# Patient Record
Sex: FEMALE | Employment: PART TIME | URBAN - METROPOLITAN AREA
[De-identification: names, ages, dates, MRNs, and addresses within clinical notes are randomized per-mention and may not be internally consistent; named-entity substitution may affect disease eponyms.]

---

## 2024-08-04 ENCOUNTER — OFFICE VISIT (OUTPATIENT)
Dept: URGENT CARE | Age: 29
End: 2024-08-04
Payer: COMMERCIAL

## 2024-08-04 VITALS
DIASTOLIC BLOOD PRESSURE: 73 MMHG | SYSTOLIC BLOOD PRESSURE: 115 MMHG | OXYGEN SATURATION: 99 % | TEMPERATURE: 98.3 F | HEART RATE: 80 BPM | RESPIRATION RATE: 18 BRPM

## 2024-08-04 DIAGNOSIS — L23.7 POISON IVY DERMATITIS: Primary | ICD-10-CM

## 2024-08-04 PROCEDURE — 99213 OFFICE O/P EST LOW 20 MIN: CPT | Performed by: EMERGENCY MEDICINE

## 2024-08-04 RX ORDER — PREDNISONE 10 MG/1
TABLET ORAL
Qty: 54 TABLET | Refills: 0 | Status: SHIPPED | OUTPATIENT
Start: 2024-08-04 | End: 2024-08-18

## 2024-08-04 NOTE — PATIENT INSTRUCTIONS
Take steroids as directed. Recommend to take them in the morning and with food. Stop prior prescription of prednisone.   Benadryl OTC as needed.  Recommend calamine lotion and oatmeal baths.  Wash all linens with hot soap and water.   PCP follow-up in 3-5 days  Proceed to the ER if symptoms worsen.

## 2024-08-04 NOTE — PROGRESS NOTES
[facility]        NAME: Natividad Meyer is a 28 y.o. female  : 1995    MRN: [id number]  DATE: 2024  TIME: 5:39 PM      Assessment and Plan     Poison ivy dermatitis [L23.7]  1. Poison ivy dermatitis  predniSONE 10 mg tablet            Patient Instructions   Take steroids as directed. Recommend to take them in the morning and with food. Stop prior prescription of prednisone.   Benadryl OTC as needed.  Recommend calamine lotion and oatmeal baths.  Wash all linens with hot soap and water.   PCP follow-up in 3-5 days  Proceed to the ER if symptoms worsen.     Chief Complaint     Chief Complaint   Patient presents with    Poison Ivy     Patient reports poison spreading.         History of Present Illness     Patient is a 28-year-old female who presents with visitor at bedside.  Visitor agreeable to translate.  Reports she has worsening poison ivy.  Reports she first attempted treatment with no relief.  States she did did a virtual visit when he started on a low-dose steroid.  States the rash is spreading.  Reports dry cough.  Denies wheezing.  Denies fever.  Denies chance of pregnancy.  Denies diabetic history.        Review of Systems     Review of Systems   Constitutional:  Negative for fever.   Respiratory:  Positive for cough. Negative for wheezing.    Skin:  Positive for rash.   All other systems reviewed and are negative.        Current Medications       Current Outpatient Medications:     predniSONE 10 mg tablet, Take 6 tablets (60 mg total) by mouth daily for 4 days, THEN 5 tablets (50 mg total) daily for 2 days, THEN 4 tablets (40 mg total) daily for 2 days, THEN 3 tablets (30 mg total) daily for 2 days, THEN 2 tablets (20 mg total) daily for 2 days, THEN 1 tablet (10 mg total) daily for 2 days., Disp: 54 tablet, Rfl: 0    Current Allergies     Allergies as of 2024 - Reviewed 2024   Allergen Reaction Noted    Penicillins Other (See Comments) 2021               The following portions of the patient's history were reviewed and updated as appropriate: allergies, current medications, past family history, past medical history, past social history, past surgical history and problem list.     History reviewed. No pertinent past medical history.    History reviewed. No pertinent surgical history.    History reviewed. No pertinent family history.      Medications have been verified.        Objective     /73   Pulse 80   Temp 98.3 °F (36.8 °C)   Resp 18   SpO2 99%   No LMP recorded.         Physical Exam     Physical Exam  Vitals and nursing note reviewed.   Constitutional:       General: She is awake. She is not in acute distress.     Appearance: Normal appearance. She is not ill-appearing, toxic-appearing or diaphoretic.   HENT:      Mouth/Throat:      Lips: Pink.      Mouth: Mucous membranes are moist.      Pharynx: Oropharynx is clear. Uvula midline.   Cardiovascular:      Rate and Rhythm: Normal rate.      Pulses: Normal pulses.      Heart sounds: Normal heart sounds, S1 normal and S2 normal.   Pulmonary:      Effort: Pulmonary effort is normal.      Breath sounds: Normal breath sounds and air entry.   Skin:     General: Skin is warm.      Capillary Refill: Capillary refill takes less than 2 seconds.      Findings: Rash (Generalized erythematous raised rash consistent with poison ivy.  No drainage at this time.  No signs of infection.) present.   Neurological:      Mental Status: She is alert.   Psychiatric:         Mood and Affect: Mood normal.         Behavior: Behavior normal.         Thought Content: Thought content normal.         Judgment: Judgment normal.